# Patient Record
(demographics unavailable — no encounter records)

---

## 2025-01-07 NOTE — HISTORY OF PRESENT ILLNESS
[FreeTextEntry1] : Donna is returning to the office for a follow-up Planning on cataract surgery Feels well No CP or palps No syncope No LE edema

## 2025-01-28 NOTE — HISTORY OF PRESENT ILLNESS
[FreeTextEntry1] : Patient is a 7-year-old female presents for follow-up. [de-identified] : Patient is a 70-year-old female with past medical history significant for type 2 diabetes, osteoporosis, hypertension, multiple myeloma, sarcoidosis, CKD, presents with urinary symptoms. She reports that yesterday she developed urinary discomfort, frequency, urgency, and dysuria.  Denies associated hematuria, fever, chills, nausea, vomiting, or flank pain.  Started drinking more water and cranberry juice but symptoms persist.  Patient reports that she tripped and fell at home a week and a half ago.   She hit her head/face on wood floors and was evaluated in the emergency room.   Imaging, including CT of the head and facial bones, were reportedly negative.  She developed a significant hematoma above the right eye and has followed up with her ophthalmologist as well as an oculoplastic specialist.  Admits that swelling has improved however remains with a significantly sized hematoma above her right eye.  She is scheduled to follow-up with oculoplastics again later this week for possible drainage of hematoma.   Patient also reports that she was told upon discharge from the ER that her potassium was elevated.

## 2025-01-28 NOTE — HISTORY OF PRESENT ILLNESS
[FreeTextEntry1] : Patient is a 7-year-old female presents for follow-up. [de-identified] : Patient is a 70-year-old female with past medical history significant for type 2 diabetes, osteoporosis, hypertension, multiple myeloma, sarcoidosis, CKD, presents with urinary symptoms. She reports that yesterday she developed urinary discomfort, frequency, urgency, and dysuria.  Denies associated hematuria, fever, chills, nausea, vomiting, or flank pain.  Started drinking more water and cranberry juice but symptoms persist.  Patient reports that she tripped and fell at home a week and a half ago.   She hit her head/face on wood floors and was evaluated in the emergency room.   Imaging, including CT of the head and facial bones, were reportedly negative.  She developed a significant hematoma above the right eye and has followed up with her ophthalmologist as well as an oculoplastic specialist.  Admits that swelling has improved however remains with a significantly sized hematoma above her right eye.  She is scheduled to follow-up with oculoplastics again later this week for possible drainage of hematoma.   Patient also reports that she was told upon discharge from the ER that her potassium was elevated.

## 2025-01-28 NOTE — PHYSICAL EXAM
Wound care- follow up to find that both the L lateral lower leg and L medial thigh wounds are looking  and less slough. Wound care provided during visit. Washed with soap and water, rinsed with warm water , applied santyl followed by a normal saline moist 2x2,covered with an allyven border. Pt's  DTI on L heel remains red- please keep heels off bed with use of pillows.Will follow up next week.   [Normal Sclera/Conjunctiva] : normal sclera/conjunctiva [EOMI] : extraocular movements intact [No Edema] : there was no peripheral edema [Normal] : soft, non-tender, non-distended, no masses palpated, no HSM and normal bowel sounds [No CVA Tenderness] : no CVA  tenderness [Grossly Normal Strength/Tone] : grossly normal strength/tone [No Rash] : no rash [No Focal Deficits] : no focal deficits [Normal Affect] : the affect was normal [Alert and Oriented x3] : oriented to person, place, and time [Normal Insight/Judgement] : insight and judgment were intact [de-identified] : Large hematoma above right eye

## 2025-01-28 NOTE — PHYSICAL EXAM
[Normal Sclera/Conjunctiva] : normal sclera/conjunctiva [EOMI] : extraocular movements intact [No Edema] : there was no peripheral edema [Normal] : soft, non-tender, non-distended, no masses palpated, no HSM and normal bowel sounds [No CVA Tenderness] : no CVA  tenderness [Grossly Normal Strength/Tone] : grossly normal strength/tone [No Rash] : no rash [No Focal Deficits] : no focal deficits [Normal Affect] : the affect was normal [Alert and Oriented x3] : oriented to person, place, and time [Normal Insight/Judgement] : insight and judgment were intact [de-identified] : Large hematoma above right eye

## 2025-02-13 NOTE — HISTORY OF PRESENT ILLNESS
[FreeTextEntry1] : JANELL GUAMAN is a 70 year female with a history sarcoidosis and HTN here for routine follow up.  [place] [facility] [place] Reports she is no longer on standing steroids for her sarcoidosis. She is having her daratumumab injection once a month.   HPI: She was dx with sarcoidosis in  after a skin finding and skin biopsy. In  had pulm symptoms and required steroids on and off while under the care of Dr Acosta. Then in  developed pericarditis due to unclear etiology but required treatment with colchicine. Then she had required intermittent steroids for her pulm symptoms till  when she developed new hypercalcemia of 14 and AIYANA that was associated with sarcoidosis and presumed 1,25 vitamin d mediated as it improved with steroids treatment. She then also was found to have a iGg kappa spike and a bone marrow done at [facility] confirmed MGUS (5% -10 % plasma cells and kappa restricted 5% in aspirate). No treatment was planned in that setting and a skeletal survey was negative. During that time, she required more and more HTN medications and at one point had been "over medicated" and had another AIYANA episode in  that resolved as well. She also developed A fib and required sotalol treatment. She then returned to see Dr Gomez in [place] and he referred her here for secondary HTN workup. BP could have been attributed to steroids but when she tried to come off her steroids, her calcium got worse. She is also developed borderline DMII as a result of the steroid use. Her sister at one point was on HD before she  few years ago. No other FH of kidney disease. No chronic NSAID use recently.  she was recently diagnosed with MM( now 60% on marrow plasma cells), She was started on chemo in 2021.   She was started on farxiga for proteinuria at >1gm and since then has had some itching in the vaginal area and [place] [facility] [place]rt crt 1 and K of 5. Her bp at home runs 110-120 SBP and recently last 3 days on steroids for URI and hence her bp has been high. Pt was discontinued on Farxiga as she was having symptoms of UTI.   3/11/24 Pt was accompanied by her spouse. No acute interval events. No fresh complaints. Off farxiga. On valsartan 160mg qd. Her myeloma is in remission since May 2022. She is getting Monthly Daratumumab infusion. Ler last blood work has shown Scr of 0.98. She denies dysuria/ change in UOP/ hematuria. Compliant with medications and follow ups.   2024: Since last visit: She has been doing well Her oncologist left, has a new oncologist at [facility] [place] - Dr. Lomeli  She is still on Darzalex trial - monthly with dexamethasone  On valsartan 160 mg daily and amlodipine 5 daily  Home -140/70-80s Occasionally with nausea but otherwise has been doing well Denies f/c, anorexia, abd pain, n/v/d, hematuria, foamy urine   2025 Last visit: 2024, labs today Patient seen and examined, med list reviewed and updated Vital signs stable No acute distress, no new complaints or concerns. She had trip and fall recently, sustained right eye hematoma which is improving, she was evaluated in ER and by optho/trauma optho but no intervention deemed necessary and reports CT head and neck were negative. Normal appetite, hydrates well. Patient denies weight changes, headache, dizziness, CP, SOB, abd pain, n/v/d, hematuria, dysuria, foamy urine, fever or chills.

## 2025-02-13 NOTE — HISTORY OF PRESENT ILLNESS
[FreeTextEntry1] : JANELL GUAMAN is a 70 year female with a history sarcoidosis and HTN here for routine follow up.  [place] [facility] [place]. Reports she is no longer on standing steroids for her sarcoidosis. She is having her daratumumab injection once a month.   HPI: She was dx with sarcoidosis in  after a skin finding and skin biopsy. In  had pulm symptoms and required steroids on and off while under the care of Dr Acosta. Then in  developed pericarditis due to unclear etiology but required treatment with colchicine. Then she had required intermittent steroids for her pulm symptoms till  when she developed new hypercalcemia of 14 and AIYANA that was associated with sarcoidosis and presumed 1,25 vitamin d mediated as it improved with steroids treatment. She then also was found to have a iGg kappa spike and a bone marrow done at [facility] confirmed MGUS (5% -10 % plasma cells and kappa restricted 5% in aspirate). No treatment was planned in that setting and a skeletal survey was negative. During that time, she required more and more HTN medications and at one point had been "over medicated" and had another AIYANA episode in  that resolved as well. She also developed A fib and required sotalol treatment. She then returned to see Dr Gomez in [place] and he referred her here for secondary HTN workup. BP could have been attributed to steroids but when she tried to come off her steroids, her calcium got worse. She is also developed borderline DMII as a result of the steroid use. Her sister at one point was on HD before she  few years ago. No other FH of kidney disease. No chronic NSAID use recently.  she was recently diagnosed with MM( now 60% on marrow plasma cells), She was started on chemo in 2021.   She was started on farxiga for proteinuria at >1gm and since then has had some itching in the vaginal area and recent labs done in [facility] [place] showed crt crt 1 and K of 5. Her bp at home runs 110-120 SBP and recently last 3 days on steroids for URI and hence her bp has been high. Pt was discontinued on Farxiga as she was having symptoms of UTI.   3/11/24 Pt was accompanied by her spouse. No acute interval events. No fresh complaints. Off farxiga. On valsartan 160mg qd. Her myeloma is in remission since May 2022. She is getting Monthly Daratumumab infusion. Ler last blood work has shown Scr of 0.98. She denies dysuria/ change in UOP/ hematuria. Compliant with medications and follow ups.   2024: Since last visit: She has been doing well Her oncologist left, has a new oncologist at [facility] [place] - Dr. Lomeli  She is still on Darzalex trial - monthly with dexamethasone  On valsartan 160 mg daily and amlodipine 5 daily  Home -140/70-80s Occasionally with nausea but otherwise has been doing well Denies f/c, anorexia, abd pain, n/v/d, hematuria, foamy urine   2025 Last visit: 2024, labs today Patient seen and examined, med list reviewed and updated Vital signs stable No acute distress, no new complaints or concerns. She had trip and fall recently, sustained right eye hematoma which is improving, she was evaluated in ER and by optho/trauma optho but no intervention deemed necessary and reports CT head and neck were negative. Normal appetite, hydrates well. Patient denies weight changes, headache, dizziness, CP, SOB, abd pain, n/v/d, hematuria, dysuria, foamy urine, fever or chills.

## 2025-02-13 NOTE — PHYSICAL EXAM
[General Appearance - Alert] : alert [General Appearance - In No Acute Distress] : in no acute distress [PERRL With Normal Accommodation] : pupils were equal in size, round, and reactive to light [Oropharynx] : the oropharynx was normal [Neck Appearance] : the appearance of the neck was normal [Respiration, Rhythm And Depth] : normal respiratory rhythm and effort [Exaggerated Use Of Accessory Muscles For Inspiration] : no accessory muscle use [Heart Sounds] : normal S1 and S2 [Abdomen Soft] : soft [Abdomen Tenderness] : non-tender [No CVA Tenderness] : no ~M costovertebral angle tenderness [No Spinal Tenderness] : no spinal tenderness [] : no rash [No Focal Deficits] : no focal deficits [Oriented To Time, Place, And Person] : oriented to person, place, and time [Impaired Insight] : insight and judgment were intact [FreeTextEntry1] : right eyebrow swelling

## 2025-02-13 NOTE — ASSESSMENT
[FreeTextEntry1] : 70-year-old female with known sarcoidosis, hypercalcemia, HTN. In addition, she developed MGUS and, subsequently, Mutiple myeloma, treated with chemo, now on monthly Daratumumab study. Multiple myeloma is in remission.   1. Proteinuria: Most recent uPCR 0.6 on valsartan. proteinuria thought to be due to myeloma, but she has not had kidney biopsy as her AIYANA resolved, proteinuria has improved, and biopsy would not  plans. Cr has remained stable for some time after resolution of AIYANA in the setting of hypercalcemia related to sarcoidosis.    2. HTN: BP has been well controlled at home with -140 on amlodipine and valsartan.  3. Multiple myeloma: s/p chemotherapy, now on daratumumab trial with monthly injections, managed at [facility]. Myeloma reported to be in remission.  PLAN: - continue current antihypertensive regimen, amlodipine and valsartan - no need for renal diet, drink to thirst - avoid NSAIDs and nephrotoxins, including iodinated IV contrast as able - dose any new medications for current eGFR - follow up lab results from today - follow up visit, on-site,   Nephrology MD in 6 months, or sooner as needed   Patient verbalized understanding of above, they will call our office if any issues or concerns arise. Seen with Dr. Thurman --- Jerrica Nielson NP ([place])  Nephrology - Nurse Practitioner [facility] [address] [facility], [address] P: [phone] | F: [phone] | Teams | [place] ---

## 2025-02-13 NOTE — HISTORY OF PRESENT ILLNESS
[FreeTextEntry1] : JANELL GUAMAN is a 70 year female with a history sarcoidosis and HTN here for routine follow up.  Follows with Heme/onc [place] [facility] [place]. Reports she is no longer on standing steroids for her sarcoidosis. She is having her daratumumab injection once a month.   HPI: She was dx with sarcoidosis in  after a skin finding and skin biopsy. In  had pulm symptoms and required steroids on and off while under the care of Dr Acosta. Then in  developed pericarditis due to unclear etiology but required treatment with colchicine. Then she had required intermittent steroids for her pulm symptoms till  when she developed new hypercalcemia of 14 and AIYANA that was associated with sarcoidosis and presumed 1,25 vitamin d mediated as it improved with steroids treatment. She then also was found to have a iGg kappa spike and a bone marrow done at [facility] confirmed MGUS (5% -10 % plasma cells and kappa restricted 5% in aspirate). No treatment was planned in that setting and a skeletal survey was negative. During that time, she required more and more HTN medications and at one point had been "over medicated" and had another AIYANA episode in  that resolved as well. She also developed A fib and required sotalol treatment. She then returned to see Dr Gomez in [place] and he referred her here for secondary HTN workup. BP could have been attributed to steroids but when she tried to come off her steroids, her calcium got worse. She is also developed borderline DMII as a result of the steroid use. Her sister at one point was on HD before she  few years ago. No other FH of kidney disease. No chronic NSAID use recently.  she was recently diagnosed with MM( now 60% on marrow plasma cells), She was started on chemo in 2021.   She was started on farxiga for proteinuria at >1gm and since then has had some itching in the vaginal area and recent labs done in [facility] showed crt crt 1 and K of 5. Her bp at home runs 110-120 SBP and recently last 3 days on steroids for URI and hence her bp has been high. Pt was discontinued on Farxiga as she was having symptoms of UTI.   3/11/24 Pt was accompanied by her spouse. No acute interval events. No fresh complaints. Off farxiga. On valsartan 160mg qd. Her myeloma is in remission since May 2022. She is getting Monthly Daratumumab infusion. Ler last blood work has shown Scr of 0.98. She denies dysuria/ change in UOP/ hematuria. Compliant with medications and follow ups.   2024: Since last visit: She has been doing well Her oncologist left, has a new oncologist at [facility] [place] - Dr. Lomeli  She is still on Darzalex trial - monthly with dexamethasone  On valsartan 160 mg daily and amlodipine 5 daily  Home -140/70-80s Occasionally with nausea but otherwise has been doing well Denies f/c, anorexia, abd pain, n/v/d, hematuria, foamy urine   2025 Last visit: 2024, labs today Patient seen and examined, med list reviewed and updated Vital signs stable No acute distress, no new complaints or concerns. She had trip and fall recently, sustained right eye hematoma which is improving, she was evaluated in ER and by optho/trauma optho but no intervention deemed necessary and reports CT head and neck were negative. Normal appetite, hydrates well. Patient denies weight changes, headache, dizziness, CP, SOB, abd pain, n/v/d, hematuria, dysuria, foamy urine, fever or chills.

## 2025-02-13 NOTE — REVIEW OF SYSTEMS
[As Noted in HPI] : as noted in HPI [Arthralgias] : arthralgias [Negative] : Heme/Lymph [FreeTextEntry9] : Right knee pain after mechanical injury  [Fever] : no fever [Chills] : no chills [Chest Pain] : no chest pain [Palpitations] : no palpitations [Lower Ext Edema] : no lower extremity edema [Shortness Of Breath] : no shortness of breath [Wheezing] : no wheezing [Cough] : no cough [SOB on Exertion] : no shortness of breath during exertion [Abdominal Pain] : no abdominal pain [Vomiting] : no vomiting [Dysuria] : no dysuria [Confused] : no confusion [Dizziness] : no dizziness [FreeTextEntry3] : right eyebrow swelling, improving, from fall

## 2025-02-13 NOTE — ASSESSMENT
[FreeTextEntry1] : 70-year-old female with known sarcoidosis, hypercalcemia, HTN. In addition, she developed MGUS and, subsequently, Mutiple myeloma, treated with chemo, now on monthly Daratumumab study. Multiple myeloma is in remission.   1. Proteinuria: Most recent uPCR 0.6 on valsartan. proteinuria thought to be due to myeloma, but she has not had kidney biopsy as her AIYANA resolved, proteinuria has improved, and biopsy would not  plans. Cr has remained stable for some time after resolution of AIYANA in the setting of hypercalcemia related to sarcoidosis.    2. HTN: BP has been well controlled at home with -140 on amlodipine and valsartan.  3. Multiple myeloma: s/p chemotherapy, now on daratumumab trial with monthly injections, managed at [facility]. Myeloma reported to be in remission.  PLAN: - continue current antihypertensive regimen, amlodipine and valsartan - no need for renal diet, drink to thirst - avoid NSAIDs and nephrotoxins, including iodinated IV contrast as able - dose any new medications for current eGFR - follow up lab results from today - follow up visit, on-site,   Nephrology MD in 6 months, or sooner as needed   Patient verbalized understanding of above, they will call our office if any issues or concerns arise. Seen with Dr. Thurman --- Jerrica Nielson NP ([place])  Nephrology - Nurse Practitioner [facility] [address] [facility], [address] P: [phone] | F: [phone] | Teams | [place] ---

## 2025-02-13 NOTE — PHYSICAL EXAM
[General Appearance - Alert] : alert [General Appearance - In No Acute Distress] : in no acute distress [PERRL With Normal Accommodation] : pupils were equal in size, round, and reactive to light [Oropharynx] : the oropharynx was normal [Neck Appearance] : the appearance of the neck was normal [Respiration, Rhythm And Depth] : normal respiratory rhythm and effort [Exaggerated Use Of Accessory Muscles For Inspiration] : no accessory muscle use [Heart Sounds] : normal S1 and S2 [Abdomen Soft] : soft [Abdomen Tenderness] : non-tender [No CVA Tenderness] : no ~M costovertebral angle tenderness [No Spinal Tenderness] : no spinal tenderness [] : no rash [No Focal Deficits] : no focal deficits [Oriented To Time, Place, And Person] : oriented to person, place, and time [Impaired Insight] : insight and judgment were intact [FreeTextEntry1] : right eyebrow swelling No

## 2025-02-13 NOTE — ASSESSMENT
[FreeTextEntry1] : 70-year-old female with known sarcoidosis, hypercalcemia, HTN. In addition, she developed MGUS and, subsequently, Mutiple myeloma, treated with chemo, now on monthly Daratumumab study. Multiple myeloma is in remission.   1. Proteinuria: Most recent uPCR 0.6 on valsartan. proteinuria thought to be due to myeloma, but she has not had kidney biopsy as her AIYANA resolved, proteinuria has improved, and biopsy would not  plans. Cr has remained stable for some time after resolution of AIYANA in the setting of hypercalcemia related to sarcoidosis.    2. HTN: BP has been well controlled at home with -140 on amlodipine and valsartan.  3. Multiple myeloma: s/p chemotherapy, now on daratumumab trial with monthly injections, managed at [facility]. Myeloma reported to be in remission.  PLAN: - continue current antihypertensive regimen, amlodipine and valsartan - no need for renal diet, drink to thirst - avoid NSAIDs and nephrotoxins, including iodinated IV contrast as able - dose any new medications for current eGFR - follow up lab results from today - follow up visit, on-site,   Nephrology MD in 6 months, or sooner as needed   Patient verbalized understanding of above, they will call our office if any issues or concerns arise. Seen with Dr. Thurman --- Jerrica Nielson NP ([place])  Nephrology - Nurse Practitioner [facility] [address] [facility] [address] P: [phone] | F: [phone] | Teams | [place] ---

## 2025-03-18 NOTE — DISCUSSION/SUMMARY
[de-identified] : General Dx Discussion The patient was advised of the diagnosis. The natural history of the pathology was explained in full to the patient in layman's terms. All questions were answered. The risks and benefits of surgical and non-surgical treatment alternatives were explained in full to the patient.  Case discussed. Treatment options including medications, injections, therapy and surgery discussed. Questions answered. The patient decided to proceed with visco injections.  Aspiration and Euflexxa injection tolerated well. Ice as needed. Follow up in 1 week to continue the series.   Entered by CAREN Ruiz acting as scribe. - The documentation recorded by the scribe accurately reflects the service I personally performed and the decisions made by me.

## 2025-03-18 NOTE — DISCUSSION/SUMMARY
[de-identified] : General Dx Discussion The patient was advised of the diagnosis. The natural history of the pathology was explained in full to the patient in layman's terms. All questions were answered. The risks and benefits of surgical and non-surgical treatment alternatives were explained in full to the patient.  Case discussed. Treatment options including medications, injections, therapy and surgery discussed. Questions answered. The patient decided to proceed with visco injections.  Aspiration and Euflexxa injection tolerated well. Ice as needed. Follow up in 1 week to continue the series.   Entered by CAREN Ruiz acting as scribe. - The documentation recorded by the scribe accurately reflects the service I personally performed and the decisions made by me.

## 2025-03-18 NOTE — HISTORY OF PRESENT ILLNESS
[9] : 9 [6] : 6 [Dull/Aching] : dull/aching [Localized] : localized [Sharp] : sharp [Constant] : constant [Leisure] : leisure [Sleep] : sleep [Rest] : rest [Meds] : meds [Ice] : ice [Heat] : heat [Physical therapy] : physical therapy [Standing] : standing [Walking] : walking [Stairs] : stairs [Retired] : Work status: retired [Gradual] : gradual [Result of repetitive motion] : result of repetitive motion [de-identified] : Patient is here to follow up on the RIGHT KNEE. Patient states pain is from Friday  [] : Post Surgical Visit: no [FreeTextEntry1] : Right Knee [FreeTextEntry9] : Tylenol [de-identified] : Dr. Martinez

## 2025-03-18 NOTE — PROCEDURE
[Large Joint Injection] : Large joint injection [Right] : of the right [Knee] : knee [Pain] : pain [Inflammation] : inflammation [X-ray evidence of Osteoarthritis on this or prior visit] : x-ray evidence of Osteoarthritis on this or prior visit [Alcohol] : alcohol [Betadine] : betadine [Ethyl Chloride sprayed topically] : ethyl chloride sprayed topically [Sterile technique used] : sterile technique used [Euflexxa(20mg)] : 20mg of Euflexxa [#1] : series #1 [Effusion] : effusion [] : Patient tolerated procedure well [Call if redness, pain or fever occur] : call if redness, pain or fever occur [Apply ice for 15min out of every hour for the next 12-24 hours as tolerated] : apply ice for 15 minutes out of every hour for the next 12-24 hours as tolerated [Previous OTC use and PT nontherapeutic] : patient has tried OTC's including aspirin, Ibuprofen, Aleve, etc or prescription NSAIDS, and/or exercises at home and/or physical therapy without satisfactory response [Patient had decreased mobility in the joint] : patient had decreased mobility in the joint [Risks, benefits, alternatives discussed / Verbal consent obtained] : the risks benefits, and alternatives have been discussed, and verbal consent was obtained [Prior failure or difficult injection] : prior failure or difficult injection [All ultrasound images have been permanently captured and stored accordingly in our picture archiving and communication system] : All ultrasound images have been permanently captured and stored accordingly in our picture archiving and communication system [Visualization of the needle and placement of injection was performed without complication] : visualization of the needle and placement of injection was performed without complication [de-identified] : 35cc [de-identified] : serous

## 2025-03-18 NOTE — HISTORY OF PRESENT ILLNESS
[9] : 9 [6] : 6 [Dull/Aching] : dull/aching [Localized] : localized [Sharp] : sharp [Constant] : constant [Leisure] : leisure [Sleep] : sleep [Rest] : rest [Meds] : meds [Ice] : ice [Heat] : heat [Physical therapy] : physical therapy [Standing] : standing [Walking] : walking [Stairs] : stairs [Retired] : Work status: retired [Gradual] : gradual [Result of repetitive motion] : result of repetitive motion [de-identified] : Patient is here to follow up on the RIGHT KNEE. Patient states pain is from Friday  [] : Post Surgical Visit: no [FreeTextEntry1] : Right Knee [FreeTextEntry9] : Tylenol [de-identified] : Dr. Martinez

## 2025-03-18 NOTE — PROCEDURE
[Large Joint Injection] : Large joint injection [Right] : of the right [Knee] : knee [Pain] : pain [Inflammation] : inflammation [X-ray evidence of Osteoarthritis on this or prior visit] : x-ray evidence of Osteoarthritis on this or prior visit [Alcohol] : alcohol [Betadine] : betadine [Ethyl Chloride sprayed topically] : ethyl chloride sprayed topically [Sterile technique used] : sterile technique used [Euflexxa(20mg)] : 20mg of Euflexxa [#1] : series #1 [Effusion] : effusion [] : Patient tolerated procedure well [Call if redness, pain or fever occur] : call if redness, pain or fever occur [Apply ice for 15min out of every hour for the next 12-24 hours as tolerated] : apply ice for 15 minutes out of every hour for the next 12-24 hours as tolerated [Previous OTC use and PT nontherapeutic] : patient has tried OTC's including aspirin, Ibuprofen, Aleve, etc or prescription NSAIDS, and/or exercises at home and/or physical therapy without satisfactory response [Patient had decreased mobility in the joint] : patient had decreased mobility in the joint [Risks, benefits, alternatives discussed / Verbal consent obtained] : the risks benefits, and alternatives have been discussed, and verbal consent was obtained [Prior failure or difficult injection] : prior failure or difficult injection [All ultrasound images have been permanently captured and stored accordingly in our picture archiving and communication system] : All ultrasound images have been permanently captured and stored accordingly in our picture archiving and communication system [Visualization of the needle and placement of injection was performed without complication] : visualization of the needle and placement of injection was performed without complication [de-identified] : 35cc [de-identified] : serous

## 2025-03-18 NOTE — PHYSICAL EXAM
[Right] : right knee [NL (0)] : extension 0 degrees [5___] : hamstring 5[unfilled]/5 [Negative] : negative Evan's [] : no pain with varus stress [[id number]] : flexion 105 degrees

## 2025-03-24 NOTE — PROCEDURE
[Large Joint Injection] : Large joint injection [Right] : of the right [Knee] : knee [Pain] : pain [Inflammation] : inflammation [X-ray evidence of Osteoarthritis on this or prior visit] : x-ray evidence of Osteoarthritis on this or prior visit [Alcohol] : alcohol [Betadine] : betadine [Ethyl Chloride sprayed topically] : ethyl chloride sprayed topically [Sterile technique used] : sterile technique used [Euflexxa(20mg)] : 20mg of Euflexxa [#2] : series #2 [Effusion] : effusion [] : Patient tolerated procedure well [Call if redness, pain or fever occur] : call if redness, pain or fever occur [Apply ice for 15min out of every hour for the next 12-24 hours as tolerated] : apply ice for 15 minutes out of every hour for the next 12-24 hours as tolerated [Previous OTC use and PT nontherapeutic] : patient has tried OTC's including aspirin, Ibuprofen, Aleve, etc or prescription NSAIDS, and/or exercises at home and/or physical therapy without satisfactory response [Patient had decreased mobility in the joint] : patient had decreased mobility in the joint [Risks, benefits, alternatives discussed / Verbal consent obtained] : the risks benefits, and alternatives have been discussed, and verbal consent was obtained [Prior failure or difficult injection] : prior failure or difficult injection [All ultrasound images have been permanently captured and stored accordingly in our picture archiving and communication system] : All ultrasound images have been permanently captured and stored accordingly in our picture archiving and communication system [Visualization of the needle and placement of injection was performed without complication] : visualization of the needle and placement of injection was performed without complication [de-identified] : 40cc [de-identified] : serous

## 2025-03-24 NOTE — DISCUSSION/SUMMARY
[de-identified] : General Dx Discussion The patient was advised of the diagnosis. The natural history of the pathology was explained in full to the patient in layman's terms. All questions were answered. The risks and benefits of surgical and non-surgical treatment alternatives were explained in full to the patient.  Case discussed. Aspiration and Euflexxa injection tolerated well. Ice as needed. Follow up in 1 week to continue the series.   Entered by CAREN Ruiz acting as scribe. - The documentation recorded by the scribe accurately reflects the service I personally performed and the decisions made by me.

## 2025-03-24 NOTE — HISTORY OF PRESENT ILLNESS
[Gradual] : gradual [Result of repetitive motion] : result of repetitive motion [9] : 9 [6] : 6 [Dull/Aching] : dull/aching [Localized] : localized [Sharp] : sharp [Constant] : constant [Leisure] : leisure [Sleep] : sleep [Rest] : rest [Meds] : meds [Ice] : ice [Heat] : heat [Physical therapy] : physical therapy [Standing] : standing [Walking] : walking [Stairs] : stairs [Retired] : Work status: retired [2] : 2 [Euflexxa] : Euflexxa [de-identified] : 03/24/2025: Patient is here for Euflexxa #2 for her Rt knee. [] : Post Surgical Visit: no [FreeTextEntry1] : Right Knee [FreeTextEntry9] : Tylenol [de-identified] : Dr. Martinez [de-identified] : 3/17/25

## 2025-03-31 NOTE — HISTORY OF PRESENT ILLNESS
[Gradual] : gradual [Result of repetitive motion] : result of repetitive motion [9] : 9 [6] : 6 [Dull/Aching] : dull/aching [Localized] : localized [Sharp] : sharp [Constant] : constant [Leisure] : leisure [Sleep] : sleep [Rest] : rest [Meds] : meds [Ice] : ice [Heat] : heat [Physical therapy] : physical therapy [Standing] : standing [Walking] : walking [Stairs] : stairs [Retired] : Work status: retired [3] : 3 [Euflexxa] : Euflexxa [de-identified] : 03/31/2025: Patient is here for Euflexxa #  3 for her Rt knee. [] : Post Surgical Visit: no [FreeTextEntry1] : Right Knee [FreeTextEntry9] : Tylenol [de-identified] : Dr. Martinez [de-identified] : 3/17/25 [de-identified] : rt knee

## 2025-03-31 NOTE — PROCEDURE
[Large Joint Injection] : Large joint injection [Right] : of the right [Knee] : knee [Pain] : pain [Inflammation] : inflammation [X-ray evidence of Osteoarthritis on this or prior visit] : x-ray evidence of Osteoarthritis on this or prior visit [Alcohol] : alcohol [Betadine] : betadine [Ethyl Chloride sprayed topically] : ethyl chloride sprayed topically [Sterile technique used] : sterile technique used [Euflexxa(20mg)] : 20mg of Euflexxa [] : Patient tolerated procedure well [Call if redness, pain or fever occur] : call if redness, pain or fever occur [Apply ice for 15min out of every hour for the next 12-24 hours as tolerated] : apply ice for 15 minutes out of every hour for the next 12-24 hours as tolerated [Previous OTC use and PT nontherapeutic] : patient has tried OTC's including aspirin, Ibuprofen, Aleve, etc or prescription NSAIDS, and/or exercises at home and/or physical therapy without satisfactory response [Patient had decreased mobility in the joint] : patient had decreased mobility in the joint [Risks, benefits, alternatives discussed / Verbal consent obtained] : the risks benefits, and alternatives have been discussed, and verbal consent was obtained [Prior failure or difficult injection] : prior failure or difficult injection [All ultrasound images have been permanently captured and stored accordingly in our picture archiving and communication system] : All ultrasound images have been permanently captured and stored accordingly in our picture archiving and communication system [Visualization of the needle and placement of injection was performed without complication] : visualization of the needle and placement of injection was performed without complication [#3] : series #3

## 2025-03-31 NOTE — DISCUSSION/SUMMARY
[de-identified] : General Dx Discussion The patient was advised of the diagnosis. The natural history of the pathology was explained in full to the patient in layman's terms. All questions were answered. The risks and benefits of surgical and non-surgical treatment alternatives were explained in full to the patient.  Case discussed. Euflexxa injection tolerated well. Ice as needed. Consult with  or  in 6 weeks if pain persists.   Entered by CAREN Ruiz acting as scribe. - The documentation recorded by the scribe accurately reflects the service I personally performed and the decisions made by me.